# Patient Record
Sex: FEMALE | Race: AMERICAN INDIAN OR ALASKA NATIVE
[De-identification: names, ages, dates, MRNs, and addresses within clinical notes are randomized per-mention and may not be internally consistent; named-entity substitution may affect disease eponyms.]

---

## 2018-07-04 ENCOUNTER — HOSPITAL ENCOUNTER (EMERGENCY)
Dept: HOSPITAL 31 - C.ER | Age: 38
Discharge: HOME | End: 2018-07-04
Payer: COMMERCIAL

## 2018-07-04 VITALS
TEMPERATURE: 98.9 F | HEART RATE: 70 BPM | DIASTOLIC BLOOD PRESSURE: 80 MMHG | RESPIRATION RATE: 16 BRPM | OXYGEN SATURATION: 98 % | SYSTOLIC BLOOD PRESSURE: 140 MMHG

## 2018-07-04 DIAGNOSIS — L91.0: Primary | ICD-10-CM

## 2018-07-04 NOTE — C.PDOC
History Of Present Illness





37 yo female come in for evaluation of skin mass noted over piercing side over 

navel. Pt admits, noted it " some time ago but recently when Im touching it , 

it hurts". Otherwise, pt denies fever, chills, open wound or discharges. 

Ambulate to Ed for evaluation, not in any apparent distress.


Time Seen by Provider: 07/04/18 07:30


Chief Complaint (Nursing): Abnormal Skin Integrity


History Per: Patient





Past Medical History


Reviewed: Historical Data, Nursing Documentation, Vital Signs


Vital Signs: 





 Last Vital Signs











Temp  99 F   07/04/18 07:39


 


Pulse  80   07/04/18 07:39


 


Resp  18   07/04/18 07:39


 


BP  150/81   07/04/18 07:39


 


Pulse Ox  100   07/04/18 07:39














- Medical History


PMH: No Chronic Diseases


Family History: States: No Known Family Hx





- Social History


Hx Alcohol Use: Yes


Hx Substance Use: No





Review Of Systems


Except As Marked, All Systems Reviewed And Found Negative.


Constitutional: Negative for: Fever, Chills


Gastrointestinal: Negative for: Nausea, Vomiting, Abdominal Pain, Diarrhea


Musculoskeletal: Negative for: Neck Pain


Skin: Positive for: Lesions





Physical Exam





- Physical Exam


Appears: Well, Non-toxic, No Acute Distress


Skin: Normal Color, Warm, Other ((+) small keloid noted next to one of multiple 

piercing over umbilicus. No edema, no erythema, no wound draining, no proximal 

streakiing, no flactualnce.)


Head: Normacephalic


Gastrointestinal/Abdominal: Soft, No Tenderness, No Distention, No Guarding, No 

Rebound


Extremity: Normal ROM, No Tenderness





ED Course And Treatment


O2 Sat by Pulse Oximetry: 100


Pulse Ox Interpretation: Normal


Progress Note: On re-eavl, pt is afebrile, hemodynamicaly stable.  NOn-toxic.  

ABd: benign, (-) guarding, (-) rebound, (-) localized tenderness.  SKin: (+) 

keloid at umbilicus, no cellulitis, no flactulance.  Pt advised, ref. to F/U 

with Surgery/Plastic as need for further eval. and tx.





Disposition


Counseled Patient/Family Regarding: Diagnosis, Need For Followup





- Disposition


Referrals: 


[facility] [Outside]


Disposition: HOME/ ROUTINE


Disposition Time: 07:49


Condition: STABLE


Additional Instructions: 


Follow up with Surgery or Plastic if consider to remove


Return if any new changes.


Instructions:  Keloids





- Clinical Impression


Clinical Impression: 


 Keloid